# Patient Record
Sex: MALE | Race: WHITE
[De-identification: names, ages, dates, MRNs, and addresses within clinical notes are randomized per-mention and may not be internally consistent; named-entity substitution may affect disease eponyms.]

---

## 2019-05-24 ENCOUNTER — HOSPITAL ENCOUNTER (OUTPATIENT)
Dept: HOSPITAL 8 - CACL | Age: 49
Discharge: HOME | End: 2019-05-24
Attending: INTERNAL MEDICINE
Payer: COMMERCIAL

## 2019-05-24 VITALS — BODY MASS INDEX: 26.56 KG/M2 | HEIGHT: 73 IN | WEIGHT: 200.4 LBS

## 2019-05-24 VITALS — SYSTOLIC BLOOD PRESSURE: 147 MMHG | DIASTOLIC BLOOD PRESSURE: 56 MMHG

## 2019-05-24 DIAGNOSIS — Z98.890: ICD-10-CM

## 2019-05-24 DIAGNOSIS — Z72.89: ICD-10-CM

## 2019-05-24 DIAGNOSIS — Z82.49: ICD-10-CM

## 2019-05-24 DIAGNOSIS — E66.3: ICD-10-CM

## 2019-05-24 DIAGNOSIS — I08.0: Primary | ICD-10-CM

## 2019-05-24 PROCEDURE — 93312 ECHO TRANSESOPHAGEAL: CPT

## 2019-05-24 PROCEDURE — 93325 DOPPLER ECHO COLOR FLOW MAPG: CPT

## 2019-05-24 PROCEDURE — 93321 DOPPLER ECHO F-UP/LMTD STD: CPT

## 2019-06-25 ENCOUNTER — HOSPITAL ENCOUNTER (OUTPATIENT)
Dept: HOSPITAL 8 - CACL | Age: 49
Discharge: HOME | End: 2019-06-25
Attending: INTERNAL MEDICINE
Payer: COMMERCIAL

## 2019-06-25 VITALS — SYSTOLIC BLOOD PRESSURE: 143 MMHG | DIASTOLIC BLOOD PRESSURE: 61 MMHG

## 2019-06-25 VITALS — HEIGHT: 73 IN | WEIGHT: 210.43 LBS | BODY MASS INDEX: 27.89 KG/M2

## 2019-06-25 DIAGNOSIS — Z98.890: ICD-10-CM

## 2019-06-25 DIAGNOSIS — I35.1: Primary | ICD-10-CM

## 2019-06-25 DIAGNOSIS — E66.3: ICD-10-CM

## 2019-06-25 DIAGNOSIS — Z72.89: ICD-10-CM

## 2019-06-25 DIAGNOSIS — Z82.49: ICD-10-CM

## 2019-06-25 PROCEDURE — 93458 L HRT ARTERY/VENTRICLE ANGIO: CPT

## 2019-06-25 PROCEDURE — C1894 INTRO/SHEATH, NON-LASER: HCPCS

## 2019-06-25 PROCEDURE — C1769 GUIDE WIRE: HCPCS

## 2019-06-25 PROCEDURE — 99157 MOD SED OTHER PHYS/QHP EA: CPT

## 2019-06-25 PROCEDURE — 99156 MOD SED OTH PHYS/QHP 5/>YRS: CPT

## 2019-07-01 NOTE — PROGRESS NOTES
REFERRING PHYSICIAN: Dr. Carolyn Vital MD.     CONSULTING PHYSICIAN: Amairani Muller M.D. FACS.    CHIEF COMPLAINT: Fatigue.    HISTORY OF PRESENT ILLNESS: The patient is a 48 y.o. male with history significant for severe aortic insufficiency and bicuspid aortic valve.  No history of HTN, diabetes or hyperlipidemia.  Today he states.  He is able to walk 1/2 mile before having to stop and rest. Over the past 6 months he had noticed a distinct increase in fatigue.  He also has noted palpitations when lying down to rest.  He denies chest pain/pressure, dyspnea, orthopnea, dizziness, lower extremity edema, syncope, or near syncope.      PAST MEDICAL HISTORY:   Active Ambulatory Problems     Diagnosis Date Noted   • No Active Ambulatory Problems     Resolved Ambulatory Problems     Diagnosis Date Noted   • No Resolved Ambulatory Problems     No Additional Past Medical History       PAST SURGICAL HISTORY: History reviewed. No pertinent surgical history.     ALLERGIES: No Known Allergies     CURRENT MEDICATIONS: No current outpatient prescriptions on file.    FAMILY HISTORY: History reviewed. No pertinent family history.     SOCIAL HISTORY:   Social History     Social History   • Marital status:      Spouse name: N/A   • Number of children: N/A   • Years of education: N/A     Occupational History   • Not on file.     Social History Main Topics   • Smoking status: Never Smoker   • Smokeless tobacco: Current User     Types: Chew   • Alcohol use 2.4 oz/week     4 Cans of beer per week   • Drug use: No   • Sexual activity: Not on file     Other Topics Concern   • Not on file     Social History Narrative   • No narrative on file       REVIEW OF SYSTEMS:  Review of Systems   Constitutional: Negative for chills, fever and weight loss.   HENT: Negative for ear pain, hearing loss and tinnitus.    Eyes: Negative for blurred vision, double vision and photophobia.   Respiratory: Negative for cough, shortness of breath and  "wheezing.    Cardiovascular: Positive for palpitations. Negative for chest pain, orthopnea and leg swelling.   Gastrointestinal: Negative for abdominal pain, diarrhea, melena, nausea and vomiting.   Genitourinary: Negative for dysuria, flank pain, frequency, hematuria and urgency.   Skin: Negative for itching and rash.   Neurological: Negative for tingling, seizures, loss of consciousness, weakness and headaches.   Psychiatric/Behavioral: Negative for depression, memory loss and suicidal ideas. The patient does not have insomnia.          PHYSICAL EXAMINATION:    /68 (BP Location: Left arm, Patient Position: Sitting, BP Cuff Size: Adult)   Pulse 63   Temp 36.5 °C (97.7 °F) (Temporal)   Ht 1.854 m (6' 1\")   Wt 95.3 kg (210 lb 1.6 oz)   SpO2 97%   BMI 27.72 kg/m² .    Physical Exam   Constitutional: He is oriented to person, place, and time and well-developed, well-nourished, and in no distress. No distress.   HENT:   Head: Normocephalic and atraumatic.   Eyes: Pupils are equal, round, and reactive to light.   Neck: Neck supple. No JVD present.   Cardiovascular: Normal rate, regular rhythm and intact distal pulses.  Exam reveals no gallop and no friction rub.    Murmur heard.   Diastolic murmur is present with a grade of 2/6   Pulmonary/Chest: Effort normal and breath sounds normal. No respiratory distress. He has no wheezes. He has no rales.   Abdominal: Soft. He exhibits no distension. There is no tenderness. There is no rebound.   Musculoskeletal: He exhibits no edema, tenderness or deformity.   Neurological: He is alert and oriented to person, place, and time. Gait normal. GCS score is 15.   Skin: Skin is warm and dry. No rash noted. No erythema.   Psychiatric: Memory and affect normal.   Nursing note and vitals reviewed.      LABS REVIEWED:  3/18/2019: TSH 0.88, , K 4.2, , CO2 23, BUN 18, Scr 1.0, glucose 87, AST 26, ALT 37.    IMAGING REVIEWED AND INTERPRETED:    ECHOCARDIOGRAM:  ESVIN " 5/21/2019 ([facility])  Normal LV function, mild MR, bicuspid aortic valve stenosis with severe AI.  The valve does not coapt well.    ANGIOGRAM: 6/25/2019 [facility]  Normal.      IMPRESSION:  Severe aortic regurgitation, bicuspid aortic valve.      PLAN:  I recommend that he undergo aortic valve replacement and intraoperative transesophageal echocardiography.    The procedure, its risks, benefits, potential complications and alternative treatments were discussed with the patient in detail including the risks should he decide not to undergo my recommended treatment. All of his questions were answered to his satisfaction and he is willing to proceed with the operation. The risks include death, stroke,  infection: to include a rare bacterial infection related to the use of the heart/lung machine, deborah-operative myocardial infarction, dysrhythmias, diaphragmatic paralysis, chest wall paresthesia, tracheostomy, kidney or other organ failure, possible return to the operating room for bleeding, bleeding requiring transfusion with its attendant risks including AIDS or hepatitis, dehiscence of surgical incisions, respiratory complications including the need for prolonged ventilator support, Protamine or other drug reaction, peripheral neuropathy, loss of limb, and miscount of surgical items. The STS mortality risk score is 1% and the morbidity and mortality risk score is 4.2%. The scores were discussed with patient.    Findings and recommendations have been discussed with the patient’s cardiologist Dr. Shirley Vital.  Thank you for this very challenging consultation and participation in the patient’s care.  I will keep you apprised of all future developments.      The operation is scheduled for Tuesday, August 6, 2019 at 7:30 AM at [facility].       Sincerely,     Amairani Muller M.D. MARISOL.      Amairani GOODWIN M.D. FACS performed a substantial portion of the EM visit face-to-face with the same  patient on the same date of service with the APRN, AWAIS Camara. I was personally involved in reviewing and interpreting the films and conducted elements of the history and physical exam. I performed all of the medical decision making for the patient.

## 2019-07-02 ENCOUNTER — OFFICE VISIT (OUTPATIENT)
Dept: SURGERY | Facility: MEDICAL CENTER | Age: 49
End: 2019-07-02
Payer: COMMERCIAL

## 2019-07-02 VITALS
SYSTOLIC BLOOD PRESSURE: 154 MMHG | HEIGHT: 73 IN | HEART RATE: 63 BPM | DIASTOLIC BLOOD PRESSURE: 68 MMHG | WEIGHT: 210.1 LBS | TEMPERATURE: 97.7 F | OXYGEN SATURATION: 97 % | BODY MASS INDEX: 27.85 KG/M2

## 2019-07-02 DIAGNOSIS — Q23.1 AORTIC INSUFFICIENCY DUE TO BICUSPID AORTIC VALVE: ICD-10-CM

## 2019-07-02 PROCEDURE — 99205 OFFICE O/P NEW HI 60 MIN: CPT | Performed by: THORACIC SURGERY (CARDIOTHORACIC VASCULAR SURGERY)

## 2019-07-02 ASSESSMENT — ENCOUNTER SYMPTOMS
SHORTNESS OF BREATH: 0
WEIGHT LOSS: 0
FEVER: 0
DEPRESSION: 0
COUGH: 0
VOMITING: 0
DIARRHEA: 0
TINGLING: 0
LOSS OF CONSCIOUSNESS: 0
ORTHOPNEA: 0
INSOMNIA: 0
MEMORY LOSS: 0
FLANK PAIN: 0
PALPITATIONS: 1
BLURRED VISION: 0
SEIZURES: 0
DOUBLE VISION: 0
NAUSEA: 0
CHILLS: 0
PHOTOPHOBIA: 0
WEAKNESS: 0
HEADACHES: 0
WHEEZING: 0
ABDOMINAL PAIN: 0

## 2019-07-02 NOTE — PATIENT INSTRUCTIONS
You have been scheduled for open heart surgery. A surgery scheduler will be calling you with a date for your preoperative testing.    INSTRUCTIONS FOR SURGERY:    Your surgery is scheduled for ___Tuesday August 6, 2019_____ at _____0730 am_______     at [facility]. (Times for surgery may change occasionally if an emergency arises.)       On _Monday August 5, 2019____ at 11:30am preoperative testing and paperwork must be completed at [facility], [address] [place] [address].       Go in the  Entrance and check in at the First Floor Admitting desk. Tell them you are pre-registering for your heart surgery. You will be directed from there to the Star Unit on the 2nd floor. Your preoperative testing will include blood work, urinalysis and chest x-ray and may include other testing such as carotid duplex and vein mapping. Lab work may not be done more than 72 hours before surgery.  It is ok to eat and drink before lab work    You will also meet with a cardiac nurse for pre-operative teaching.        DO NOT EAT OR DRINK ANYTHING AFTER MIDNIGHT THE NIGHT BEFORE SURGERY    PLEASE STOP THE FOLLOWING MEDICATIONS ON THE TIMES STATED BELOW:     STOP 4 DAYS PRIOR TO SURGERY: ELIQUIS, XARELTO, PRADAXA, EFFIENT                       STOP 5 DAYS PRIOR TO SURGERY: COUMADIN, EXTRA STRENGTH ASPIRIN                 STOP 7 DAYS PRIOR TO SURGERY: PLAVIX, BRILANTA                 STOP IMMEDIATELY: FISH OIL, GLUCOSAMINE, VITAMIN E AND GINKO BILOBA         On the day of surgery at 4:30am, you will admit to [facility]. Go to the Emergency Room entrance and check in at the admitting desk in the Emergency Room. Tell them you are there for heart surgery.  PLEASE DO NOT BE LATE.      Should you have any additional questions please call the office

## 2019-08-05 ENCOUNTER — HOSPITAL ENCOUNTER (INPATIENT)
Dept: HOSPITAL 8 - ORIP | Age: 49
LOS: 5 days | Discharge: HOME | DRG: 220 | End: 2019-08-10
Attending: THORACIC SURGERY (CARDIOTHORACIC VASCULAR SURGERY) | Admitting: THORACIC SURGERY (CARDIOTHORACIC VASCULAR SURGERY)
Payer: COMMERCIAL

## 2019-08-05 VITALS — HEIGHT: 73 IN | WEIGHT: 213.41 LBS | BODY MASS INDEX: 28.28 KG/M2

## 2019-08-05 DIAGNOSIS — I35.1: Primary | ICD-10-CM

## 2019-08-05 DIAGNOSIS — F17.210: ICD-10-CM

## 2019-08-05 DIAGNOSIS — I44.2: ICD-10-CM

## 2019-08-05 DIAGNOSIS — I10: ICD-10-CM

## 2019-08-05 PROCEDURE — 82947 ASSAY GLUCOSE BLOOD QUANT: CPT

## 2019-08-05 PROCEDURE — 86850 RBC ANTIBODY SCREEN: CPT

## 2019-08-05 PROCEDURE — 84132 ASSAY OF SERUM POTASSIUM: CPT

## 2019-08-05 PROCEDURE — C1768 GRAFT, VASCULAR: HCPCS

## 2019-08-05 PROCEDURE — 85014 HEMATOCRIT: CPT

## 2019-08-05 PROCEDURE — 86923 COMPATIBILITY TEST ELECTRIC: CPT

## 2019-08-05 PROCEDURE — 85347 COAGULATION TIME ACTIVATED: CPT

## 2019-08-05 PROCEDURE — 94002 VENT MGMT INPAT INIT DAY: CPT

## 2019-08-05 PROCEDURE — 88305 TISSUE EXAM BY PATHOLOGIST: CPT

## 2019-08-05 PROCEDURE — 36600 WITHDRAWAL OF ARTERIAL BLOOD: CPT

## 2019-08-05 PROCEDURE — 82040 ASSAY OF SERUM ALBUMIN: CPT

## 2019-08-05 PROCEDURE — 85025 COMPLETE CBC W/AUTO DIFF WBC: CPT

## 2019-08-05 PROCEDURE — 93321 DOPPLER ECHO F-UP/LMTD STD: CPT

## 2019-08-05 PROCEDURE — 71046 X-RAY EXAM CHEST 2 VIEWS: CPT

## 2019-08-05 PROCEDURE — 93325 DOPPLER ECHO COLOR FLOW MAPG: CPT

## 2019-08-05 PROCEDURE — 84295 ASSAY OF SERUM SODIUM: CPT

## 2019-08-05 PROCEDURE — 71045 X-RAY EXAM CHEST 1 VIEW: CPT

## 2019-08-05 PROCEDURE — 85610 PROTHROMBIN TIME: CPT

## 2019-08-05 PROCEDURE — P9045 ALBUMIN (HUMAN), 5%, 250 ML: HCPCS

## 2019-08-05 PROCEDURE — 86900 BLOOD TYPING SEROLOGIC ABO: CPT

## 2019-08-05 PROCEDURE — 80053 COMPREHEN METABOLIC PANEL: CPT

## 2019-08-05 PROCEDURE — 82962 GLUCOSE BLOOD TEST: CPT

## 2019-08-05 PROCEDURE — 85730 THROMBOPLASTIN TIME PARTIAL: CPT

## 2019-08-05 PROCEDURE — 83735 ASSAY OF MAGNESIUM: CPT

## 2019-08-05 PROCEDURE — C1760 CLOSURE DEV, VASC: HCPCS

## 2019-08-05 PROCEDURE — 85018 HEMOGLOBIN: CPT

## 2019-08-05 PROCEDURE — 82810 BLOOD GASES O2 SAT ONLY: CPT

## 2019-08-05 PROCEDURE — 93312 ECHO TRANSESOPHAGEAL: CPT

## 2019-08-05 PROCEDURE — 82330 ASSAY OF CALCIUM: CPT

## 2019-08-05 PROCEDURE — 36415 COLL VENOUS BLD VENIPUNCTURE: CPT

## 2019-08-05 PROCEDURE — 83036 HEMOGLOBIN GLYCOSYLATED A1C: CPT

## 2019-08-05 PROCEDURE — 93005 ELECTROCARDIOGRAM TRACING: CPT

## 2019-08-05 PROCEDURE — 82800 BLOOD PH: CPT

## 2019-08-05 PROCEDURE — 87081 CULTURE SCREEN ONLY: CPT

## 2019-08-05 PROCEDURE — 93880 EXTRACRANIAL BILAT STUDY: CPT

## 2019-08-05 PROCEDURE — 82803 BLOOD GASES ANY COMBINATION: CPT

## 2019-08-05 PROCEDURE — P9047 ALBUMIN (HUMAN), 25%, 50ML: HCPCS

## 2019-08-05 PROCEDURE — 80048 BASIC METABOLIC PNL TOTAL CA: CPT

## 2019-08-05 PROCEDURE — C1751 CATH, INF, PER/CENT/MIDLINE: HCPCS

## 2019-08-05 PROCEDURE — 81003 URINALYSIS AUTO W/O SCOPE: CPT

## 2019-08-05 PROCEDURE — 85049 AUTOMATED PLATELET COUNT: CPT

## 2019-08-06 PROCEDURE — B548ZZA ULTRASONOGRAPHY OF SUPERIOR VENA CAVA, GUIDANCE: ICD-10-PCS | Performed by: THORACIC SURGERY (CARDIOTHORACIC VASCULAR SURGERY)

## 2019-08-06 PROCEDURE — B246ZZ4 ULTRASONOGRAPHY OF RIGHT AND LEFT HEART, TRANSESOPHAGEAL: ICD-10-PCS | Performed by: THORACIC SURGERY (CARDIOTHORACIC VASCULAR SURGERY)

## 2019-08-06 PROCEDURE — 02RF08Z REPLACEMENT OF AORTIC VALVE WITH ZOOPLASTIC TISSUE, OPEN APPROACH: ICD-10-PCS | Performed by: THORACIC SURGERY (CARDIOTHORACIC VASCULAR SURGERY)

## 2019-08-06 PROCEDURE — 5A1221Z PERFORMANCE OF CARDIAC OUTPUT, CONTINUOUS: ICD-10-PCS | Performed by: THORACIC SURGERY (CARDIOTHORACIC VASCULAR SURGERY)

## 2019-08-06 PROCEDURE — 03HY32Z INSERTION OF MONITORING DEVICE INTO UPPER ARTERY, PERCUTANEOUS APPROACH: ICD-10-PCS | Performed by: THORACIC SURGERY (CARDIOTHORACIC VASCULAR SURGERY)

## 2019-08-06 PROCEDURE — 02HV33Z INSERTION OF INFUSION DEVICE INTO SUPERIOR VENA CAVA, PERCUTANEOUS APPROACH: ICD-10-PCS | Performed by: THORACIC SURGERY (CARDIOTHORACIC VASCULAR SURGERY)

## 2019-08-10 VITALS — DIASTOLIC BLOOD PRESSURE: 80 MMHG | SYSTOLIC BLOOD PRESSURE: 123 MMHG

## 2019-09-05 NOTE — PROGRESS NOTES
"CHIEF COMPLAINT: Post-op visit     PROCEDURE: 8/6/2019 [facility]  Aortic valve replacement (29 mm  [place] PERIMOUNT Magna Ease pericardial valve) and intraoperative ESVIN.    HPI: Mr. Castro returns today for post operative visit.  HE is accompanied by his wife.  He states he is feeling better each day.  His pain has been controlled and he is walking 3 miles/day with no problems.  He denies any shortness of breath, dizziness, syncope or near syncope. BP has been well controlled.  He has been following with Cardiology.    /64 (BP Location: Left arm, Patient Position: Sitting, BP Cuff Size: Adult)   Pulse 72   Temp 36.6 °C (97.9 °F) (Temporal)   Ht 1.854 m (6' 1\")   Wt 92.5 kg (203 lb 14.8 oz)   SpO2 99%     PHYSICAL EXAM:  CARDIAC: S1, S2, no murmurs, gallops, rub  PULMONARY: CTA bilaterally  SKIN: no edema  INCISIONS: Sternum stable, wounds well healed    PLAN: Continue ASA.  Discharged from clinic.  He will continue to follow with Dr. Carolyn Cardoso in Cardiology.  We reviewed post operative lifting/sternal precautions.    Overall, we are very pleased with the patient’s progress and we have instructed the patient to follow-up with us in the future should they have any concerns related to there surgery, otherwise, we will see the patient on a prn basis. The patient will continue to follow-up with you and there primary care physician. The patient has been informed that any further prescription refills should be done through there primary care physician and/or cardiologist.  They acknowledged understanding.  Thank you for allowing us to participate in the care of this very pleasant patient and please let us know if there is any way we may be of further assistance.  "

## 2019-09-16 ENCOUNTER — OFFICE VISIT (OUTPATIENT)
Dept: CARDIOTHORACIC SURGERY | Facility: MEDICAL CENTER | Age: 49
End: 2019-09-16
Payer: COMMERCIAL

## 2019-09-16 VITALS
WEIGHT: 203.93 LBS | DIASTOLIC BLOOD PRESSURE: 64 MMHG | OXYGEN SATURATION: 99 % | SYSTOLIC BLOOD PRESSURE: 112 MMHG | HEART RATE: 72 BPM | BODY MASS INDEX: 27.03 KG/M2 | HEIGHT: 73 IN | TEMPERATURE: 97.9 F

## 2019-09-16 DIAGNOSIS — I35.0 SEVERE AORTIC STENOSIS: ICD-10-CM

## 2019-09-16 DIAGNOSIS — Z98.890 POST-OPERATIVE STATE: ICD-10-CM

## 2019-09-16 RX ORDER — LOSARTAN POTASSIUM 50 MG/1
50 TABLET ORAL DAILY
Refills: 4 | COMMUNITY
Start: 2019-09-10

## 2020-07-06 ENCOUNTER — HOSPITAL ENCOUNTER (OUTPATIENT)
Dept: HOSPITAL 8 - CVU | Age: 50
Discharge: HOME | End: 2020-07-06
Attending: INTERNAL MEDICINE
Payer: COMMERCIAL

## 2020-07-06 DIAGNOSIS — I08.8: Primary | ICD-10-CM

## 2020-07-06 DIAGNOSIS — I11.9: ICD-10-CM

## 2020-07-06 PROCEDURE — 93306 TTE W/DOPPLER COMPLETE: CPT

## 2021-03-11 ENCOUNTER — PRE-ADMISSION TESTING (OUTPATIENT)
Dept: ADMISSIONS | Facility: MEDICAL CENTER | Age: 51
End: 2021-03-11
Attending: ORTHOPAEDIC SURGERY
Payer: COMMERCIAL

## 2021-03-11 DIAGNOSIS — Z01.812 PRE-OPERATIVE LABORATORY EXAMINATION: ICD-10-CM

## 2021-03-11 DIAGNOSIS — Z01.810 PRE-OPERATIVE CARDIOVASCULAR EXAMINATION: ICD-10-CM

## 2021-03-11 LAB
ALBUMIN SERPL BCP-MCNC: 4.5 G/DL (ref 3.2–4.9)
ALBUMIN/GLOB SERPL: 1.9 G/DL
ALP SERPL-CCNC: 65 U/L (ref 30–99)
ALT SERPL-CCNC: 41 U/L (ref 2–50)
ANION GAP SERPL CALC-SCNC: 10 MMOL/L (ref 7–16)
AST SERPL-CCNC: 22 U/L (ref 12–45)
BILIRUB SERPL-MCNC: 0.6 MG/DL (ref 0.1–1.5)
BUN SERPL-MCNC: 17 MG/DL (ref 8–22)
CALCIUM SERPL-MCNC: 9.2 MG/DL (ref 8.4–10.2)
CHLORIDE SERPL-SCNC: 106 MMOL/L (ref 96–112)
CO2 SERPL-SCNC: 24 MMOL/L (ref 20–33)
CREAT SERPL-MCNC: 1.07 MG/DL (ref 0.5–1.4)
EKG IMPRESSION: NORMAL
ERYTHROCYTE [DISTWIDTH] IN BLOOD BY AUTOMATED COUNT: 38 FL (ref 35.9–50)
GLOBULIN SER CALC-MCNC: 2.4 G/DL (ref 1.9–3.5)
GLUCOSE SERPL-MCNC: 93 MG/DL (ref 65–99)
HCT VFR BLD AUTO: 46.5 % (ref 42–52)
HGB BLD-MCNC: 16.1 G/DL (ref 14–18)
MCH RBC QN AUTO: 30.6 PG (ref 27–33)
MCHC RBC AUTO-ENTMCNC: 34.6 G/DL (ref 33.7–35.3)
MCV RBC AUTO: 88.4 FL (ref 81.4–97.8)
PLATELET # BLD AUTO: 170 K/UL (ref 164–446)
PMV BLD AUTO: 8.2 FL (ref 9–12.9)
POTASSIUM SERPL-SCNC: 4.4 MMOL/L (ref 3.6–5.5)
PROT SERPL-MCNC: 6.9 G/DL (ref 6–8.2)
RBC # BLD AUTO: 5.26 M/UL (ref 4.7–6.1)
SODIUM SERPL-SCNC: 140 MMOL/L (ref 135–145)
WBC # BLD AUTO: 7 K/UL (ref 4.8–10.8)

## 2021-03-11 PROCEDURE — 93005 ELECTROCARDIOGRAM TRACING: CPT

## 2021-03-11 PROCEDURE — 85027 COMPLETE CBC AUTOMATED: CPT

## 2021-03-11 PROCEDURE — 36415 COLL VENOUS BLD VENIPUNCTURE: CPT

## 2021-03-11 PROCEDURE — 93010 ELECTROCARDIOGRAM REPORT: CPT | Performed by: INTERNAL MEDICINE

## 2021-03-11 PROCEDURE — 80053 COMPREHEN METABOLIC PANEL: CPT

## 2021-03-11 RX ORDER — ROSUVASTATIN CALCIUM 10 MG/1
10 TABLET, COATED ORAL
COMMUNITY
Start: 2021-01-11

## 2021-03-11 RX ORDER — AMOXICILLIN 500 MG/1
4 CAPSULE ORAL
COMMUNITY
Start: 2021-01-25

## 2021-03-11 NOTE — OR NURSING
Noted Abn unconfirmed EKG. Pt denies any CP, dizziness, or CP. Will fax copy to cardiology. Pt instructed to go to ER if any symptoms and to follow up with his cardiologist. Pt voices understanding and states he has a telephone appt with cardiologist on Friday 3/19/21.     Dr Cleary notified of procedure and abn EKG. Please note pt had AVR 2019.

## 2021-03-11 NOTE — PREPROCEDURE INSTRUCTIONS
"Pre-admit appointment completed. \"Preparing for your procedure\" sheet given to pt along with verbal and written instructions. Pt instructed to continue regularly prescribed medications through the day before surgery.     Wife states she asked cardiologist about holding aspirin and states they were unaware he was on it and to follow Dr Calderón's instructions regarding holding aspirin for surgery.     Pt denies any problems with anesthesia.    Pt to get COVID test 3/19/21.           Pt given instructions to self isolate after COVID test and to contact doctor if any symptoms of COVID occur.  "

## 2021-03-11 NOTE — PREPROCEDURE INSTRUCTIONS
Pt given SONIA education due to score of 5 on SONIA screening tool and enc to follow up with PCP. Wife states they are already discussing with PCP.

## 2021-03-12 NOTE — OR NURSING
Dr Cleary reviewed patients medical history and EKG. Based upon information available, Dr Cleary indicates:     Ok to proceed.  Thank you.     Juaquin Cleary M.D.  [facility]

## 2021-03-19 ENCOUNTER — PRE-ADMISSION TESTING (OUTPATIENT)
Dept: ADMISSIONS | Facility: MEDICAL CENTER | Age: 51
End: 2021-03-19
Attending: ORTHOPAEDIC SURGERY
Payer: COMMERCIAL

## 2021-03-19 DIAGNOSIS — Z01.812 PRE-OPERATIVE LABORATORY EXAMINATION: ICD-10-CM

## 2021-03-19 LAB
COVID ORDER STATUS COVID19: NORMAL
SARS-COV-2 RNA RESP QL NAA+PROBE: NOTDETECTED
SPECIMEN SOURCE: NORMAL

## 2021-03-19 PROCEDURE — C9803 HOPD COVID-19 SPEC COLLECT: HCPCS

## 2021-03-19 PROCEDURE — U0005 INFEC AGEN DETEC AMPLI PROBE: HCPCS

## 2021-03-19 PROCEDURE — U0003 INFECTIOUS AGENT DETECTION BY NUCLEIC ACID (DNA OR RNA); SEVERE ACUTE RESPIRATORY SYNDROME CORONAVIRUS 2 (SARS-COV-2) (CORONAVIRUS DISEASE [COVID-19]), AMPLIFIED PROBE TECHNIQUE, MAKING USE OF HIGH THROUGHPUT TECHNOLOGIES AS DESCRIBED BY CMS-2020-01-R: HCPCS

## 2021-03-21 ENCOUNTER — ANESTHESIA EVENT (OUTPATIENT)
Dept: SURGERY | Facility: MEDICAL CENTER | Age: 51
End: 2021-03-21
Payer: COMMERCIAL

## 2021-03-22 ENCOUNTER — ANESTHESIA (OUTPATIENT)
Dept: SURGERY | Facility: MEDICAL CENTER | Age: 51
End: 2021-03-22
Payer: COMMERCIAL

## 2021-03-22 ENCOUNTER — HOSPITAL ENCOUNTER (OUTPATIENT)
Facility: MEDICAL CENTER | Age: 51
End: 2021-03-22
Attending: ORTHOPAEDIC SURGERY | Admitting: ORTHOPAEDIC SURGERY
Payer: COMMERCIAL

## 2021-03-22 VITALS
OXYGEN SATURATION: 96 % | HEART RATE: 56 BPM | DIASTOLIC BLOOD PRESSURE: 82 MMHG | RESPIRATION RATE: 16 BRPM | HEIGHT: 73 IN | BODY MASS INDEX: 28.21 KG/M2 | WEIGHT: 212.85 LBS | TEMPERATURE: 97.2 F | SYSTOLIC BLOOD PRESSURE: 120 MMHG

## 2021-03-22 PROCEDURE — 160048 HCHG OR STATISTICAL LEVEL 1-5: Performed by: ORTHOPAEDIC SURGERY

## 2021-03-22 PROCEDURE — 502581 HCHG PACK, SHOULDER ARTHROSCOPY: Performed by: ORTHOPAEDIC SURGERY

## 2021-03-22 PROCEDURE — 160046 HCHG PACU - 1ST 60 MINS PHASE II: Performed by: ORTHOPAEDIC SURGERY

## 2021-03-22 PROCEDURE — 160025 RECOVERY II MINUTES (STATS): Performed by: ORTHOPAEDIC SURGERY

## 2021-03-22 PROCEDURE — 160009 HCHG ANES TIME/MIN: Performed by: ORTHOPAEDIC SURGERY

## 2021-03-22 PROCEDURE — 160035 HCHG PACU - 1ST 60 MINS PHASE I: Performed by: ORTHOPAEDIC SURGERY

## 2021-03-22 PROCEDURE — 64415 NJX AA&/STRD BRCH PLXS IMG: CPT | Performed by: ORTHOPAEDIC SURGERY

## 2021-03-22 PROCEDURE — 501838 HCHG SUTURE GENERAL: Performed by: ORTHOPAEDIC SURGERY

## 2021-03-22 PROCEDURE — 160041 HCHG SURGERY MINUTES - EA ADDL 1 MIN LEVEL 4: Performed by: ORTHOPAEDIC SURGERY

## 2021-03-22 PROCEDURE — 700105 HCHG RX REV CODE 258: Performed by: ORTHOPAEDIC SURGERY

## 2021-03-22 PROCEDURE — 700111 HCHG RX REV CODE 636 W/ 250 OVERRIDE (IP): Performed by: ORTHOPAEDIC SURGERY

## 2021-03-22 PROCEDURE — 501162 HCHG PROBE, VULCAN: Performed by: ORTHOPAEDIC SURGERY

## 2021-03-22 PROCEDURE — 700111 HCHG RX REV CODE 636 W/ 250 OVERRIDE (IP): Performed by: ANESTHESIOLOGY

## 2021-03-22 PROCEDURE — 160036 HCHG PACU - EA ADDL 30 MINS PHASE I: Performed by: ORTHOPAEDIC SURGERY

## 2021-03-22 PROCEDURE — 160002 HCHG RECOVERY MINUTES (STAT): Performed by: ORTHOPAEDIC SURGERY

## 2021-03-22 PROCEDURE — 160029 HCHG SURGERY MINUTES - 1ST 30 MINS LEVEL 4: Performed by: ORTHOPAEDIC SURGERY

## 2021-03-22 PROCEDURE — 700101 HCHG RX REV CODE 250: Performed by: ANESTHESIOLOGY

## 2021-03-22 RX ORDER — HYDROMORPHONE HYDROCHLORIDE 1 MG/ML
0.4 INJECTION, SOLUTION INTRAMUSCULAR; INTRAVENOUS; SUBCUTANEOUS
Status: DISCONTINUED | OUTPATIENT
Start: 2021-03-22 | End: 2021-03-22 | Stop reason: HOSPADM

## 2021-03-22 RX ORDER — SODIUM CHLORIDE, SODIUM LACTATE, POTASSIUM CHLORIDE, CALCIUM CHLORIDE 600; 310; 30; 20 MG/100ML; MG/100ML; MG/100ML; MG/100ML
INJECTION, SOLUTION INTRAVENOUS CONTINUOUS
Status: DISCONTINUED | OUTPATIENT
Start: 2021-03-22 | End: 2021-03-22 | Stop reason: HOSPADM

## 2021-03-22 RX ORDER — HYDROMORPHONE HYDROCHLORIDE 1 MG/ML
0.2 INJECTION, SOLUTION INTRAMUSCULAR; INTRAVENOUS; SUBCUTANEOUS
Status: DISCONTINUED | OUTPATIENT
Start: 2021-03-22 | End: 2021-03-22 | Stop reason: HOSPADM

## 2021-03-22 RX ORDER — LIDOCAINE HYDROCHLORIDE 40 MG/ML
SOLUTION TOPICAL PRN
Status: DISCONTINUED | OUTPATIENT
Start: 2021-03-22 | End: 2021-03-22 | Stop reason: SURG

## 2021-03-22 RX ORDER — ROCURONIUM BROMIDE 10 MG/ML
INJECTION, SOLUTION INTRAVENOUS PRN
Status: DISCONTINUED | OUTPATIENT
Start: 2021-03-22 | End: 2021-03-22 | Stop reason: SURG

## 2021-03-22 RX ORDER — MEPERIDINE HYDROCHLORIDE 25 MG/ML
6.25 INJECTION INTRAMUSCULAR; INTRAVENOUS; SUBCUTANEOUS
Status: DISCONTINUED | OUTPATIENT
Start: 2021-03-22 | End: 2021-03-22 | Stop reason: HOSPADM

## 2021-03-22 RX ORDER — EPINEPHRINE 1 MG/ML(1)
AMPUL (ML) INJECTION
Status: DISCONTINUED | OUTPATIENT
Start: 2021-03-22 | End: 2021-03-22 | Stop reason: HOSPADM

## 2021-03-22 RX ORDER — DEXAMETHASONE SODIUM PHOSPHATE 4 MG/ML
INJECTION, SOLUTION INTRA-ARTICULAR; INTRALESIONAL; INTRAMUSCULAR; INTRAVENOUS; SOFT TISSUE PRN
Status: DISCONTINUED | OUTPATIENT
Start: 2021-03-22 | End: 2021-03-22 | Stop reason: SURG

## 2021-03-22 RX ORDER — DIPHENHYDRAMINE HYDROCHLORIDE 50 MG/ML
12.5 INJECTION INTRAMUSCULAR; INTRAVENOUS
Status: DISCONTINUED | OUTPATIENT
Start: 2021-03-22 | End: 2021-03-22 | Stop reason: HOSPADM

## 2021-03-22 RX ORDER — ONDANSETRON 2 MG/ML
4 INJECTION INTRAMUSCULAR; INTRAVENOUS
Status: DISCONTINUED | OUTPATIENT
Start: 2021-03-22 | End: 2021-03-22 | Stop reason: HOSPADM

## 2021-03-22 RX ORDER — OXYCODONE HYDROCHLORIDE AND ACETAMINOPHEN 5; 325 MG/1; MG/1
2 TABLET ORAL
Status: DISCONTINUED | OUTPATIENT
Start: 2021-03-22 | End: 2021-03-22 | Stop reason: HOSPADM

## 2021-03-22 RX ORDER — LABETALOL HYDROCHLORIDE 5 MG/ML
5 INJECTION, SOLUTION INTRAVENOUS
Status: DISCONTINUED | OUTPATIENT
Start: 2021-03-22 | End: 2021-03-22 | Stop reason: HOSPADM

## 2021-03-22 RX ORDER — HYDROMORPHONE HYDROCHLORIDE 1 MG/ML
0.1 INJECTION, SOLUTION INTRAMUSCULAR; INTRAVENOUS; SUBCUTANEOUS
Status: DISCONTINUED | OUTPATIENT
Start: 2021-03-22 | End: 2021-03-22 | Stop reason: HOSPADM

## 2021-03-22 RX ORDER — LIDOCAINE HYDROCHLORIDE 20 MG/ML
INJECTION, SOLUTION EPIDURAL; INFILTRATION; INTRACAUDAL; PERINEURAL PRN
Status: DISCONTINUED | OUTPATIENT
Start: 2021-03-22 | End: 2021-03-22 | Stop reason: SURG

## 2021-03-22 RX ORDER — BUPIVACAINE HYDROCHLORIDE 2.5 MG/ML
INJECTION, SOLUTION EPIDURAL; INFILTRATION; INTRACAUDAL PRN
Status: DISCONTINUED | OUTPATIENT
Start: 2021-03-22 | End: 2021-03-22 | Stop reason: SURG

## 2021-03-22 RX ORDER — HALOPERIDOL 5 MG/ML
1 INJECTION INTRAMUSCULAR
Status: DISCONTINUED | OUTPATIENT
Start: 2021-03-22 | End: 2021-03-22 | Stop reason: HOSPADM

## 2021-03-22 RX ORDER — CEFAZOLIN SODIUM 1 G/3ML
INJECTION, POWDER, FOR SOLUTION INTRAMUSCULAR; INTRAVENOUS PRN
Status: DISCONTINUED | OUTPATIENT
Start: 2021-03-22 | End: 2021-03-22 | Stop reason: SURG

## 2021-03-22 RX ORDER — ONDANSETRON 2 MG/ML
INJECTION INTRAMUSCULAR; INTRAVENOUS PRN
Status: DISCONTINUED | OUTPATIENT
Start: 2021-03-22 | End: 2021-03-22 | Stop reason: SURG

## 2021-03-22 RX ORDER — KETOROLAC TROMETHAMINE 30 MG/ML
INJECTION, SOLUTION INTRAMUSCULAR; INTRAVENOUS PRN
Status: DISCONTINUED | OUTPATIENT
Start: 2021-03-22 | End: 2021-03-22 | Stop reason: SURG

## 2021-03-22 RX ORDER — OXYCODONE HYDROCHLORIDE AND ACETAMINOPHEN 5; 325 MG/1; MG/1
1 TABLET ORAL
Status: DISCONTINUED | OUTPATIENT
Start: 2021-03-22 | End: 2021-03-22 | Stop reason: HOSPADM

## 2021-03-22 RX ORDER — MIDAZOLAM HYDROCHLORIDE 1 MG/ML
INJECTION INTRAMUSCULAR; INTRAVENOUS PRN
Status: DISCONTINUED | OUTPATIENT
Start: 2021-03-22 | End: 2021-03-22 | Stop reason: SURG

## 2021-03-22 RX ADMIN — DEXAMETHASONE SODIUM PHOSPHATE 8 MG: 4 INJECTION, SOLUTION INTRAMUSCULAR; INTRAVENOUS at 09:37

## 2021-03-22 RX ADMIN — MIDAZOLAM HYDROCHLORIDE 2 MG: 1 INJECTION, SOLUTION INTRAMUSCULAR; INTRAVENOUS at 09:14

## 2021-03-22 RX ADMIN — SUGAMMADEX 200 MG: 100 INJECTION, SOLUTION INTRAVENOUS at 10:13

## 2021-03-22 RX ADMIN — LIDOCAINE HYDROCHLORIDE 4 ML: 40 SOLUTION TOPICAL at 09:29

## 2021-03-22 RX ADMIN — FENTANYL CITRATE 50 MCG: 50 INJECTION, SOLUTION INTRAMUSCULAR; INTRAVENOUS at 09:14

## 2021-03-22 RX ADMIN — CEFAZOLIN 2 G: 1 INJECTION, POWDER, FOR SOLUTION INTRAVENOUS at 09:30

## 2021-03-22 RX ADMIN — FENTANYL CITRATE 50 MCG: 50 INJECTION, SOLUTION INTRAMUSCULAR; INTRAVENOUS at 09:26

## 2021-03-22 RX ADMIN — SODIUM CHLORIDE, POTASSIUM CHLORIDE, SODIUM LACTATE AND CALCIUM CHLORIDE: 600; 310; 30; 20 INJECTION, SOLUTION INTRAVENOUS at 08:52

## 2021-03-22 RX ADMIN — PROPOFOL 150 MG: 10 INJECTION, EMULSION INTRAVENOUS at 09:28

## 2021-03-22 RX ADMIN — ONDANSETRON 4 MG: 2 INJECTION INTRAMUSCULAR; INTRAVENOUS at 10:13

## 2021-03-22 RX ADMIN — BUPIVACAINE HYDROCHLORIDE 25 ML: 2.5 INJECTION, SOLUTION EPIDURAL; INFILTRATION; INTRACAUDAL; PERINEURAL at 09:15

## 2021-03-22 RX ADMIN — KETOROLAC TROMETHAMINE 30 MG: 30 INJECTION, SOLUTION INTRAMUSCULAR at 10:13

## 2021-03-22 RX ADMIN — ROCURONIUM BROMIDE 50 MG: 10 INJECTION, SOLUTION INTRAVENOUS at 09:28

## 2021-03-22 RX ADMIN — LIDOCAINE HYDROCHLORIDE 1 ML: 20 INJECTION, SOLUTION EPIDURAL; INFILTRATION; INTRACAUDAL; PERINEURAL at 09:15

## 2021-03-22 ASSESSMENT — PAIN SCALES - GENERAL: PAIN_LEVEL: 0

## 2021-03-22 NOTE — ANESTHESIA PROCEDURE NOTES
Peripheral Block    Date/Time: 3/22/2021 9:15 AM  Performed by: Ishmael Domingo M.D.  Authorized by: Ishmael Domingo M.D.     Patient Location:  Pre-op  Start Time:  3/22/2021 9:15 AM  End Time:  3/22/2021 9:19 AM  Reason for Block: at surgeon's request and post-op pain management ONLY    patient identified, IV checked, site marked, risks and benefits discussed, surgical consent, monitors and equipment checked, pre-op evaluation and timeout performed    Patient Position:  Supine  Prep: ChloraPrep    Monitoring:  Heart rate, continuous pulse ox and cardiac monitor  Block Region:  Upper Extremity  Upper Extremity - Block Type:  BRACHIAL PLEXUS block, Interscalene approach    Laterality:  Left  Procedures: ultrasound guided  Image captured, interpreted and electronically stored.  Local Infiltration:  Lidocaine  Strength:  2 %  Dose:  1 ml  Block Type:  Single-shot  Needle Length:  100mm  Needle Gauge:  21 G  Needle Localization:  Ultrasound guidance  Injection Assessment:  Negative aspiration for heme, no paresthesia on injection, incremental injection and local visualized surrounding nerve on ultrasound  Evidence of intravascular injection: No     US Guided Interscalene Brachial Plexus Block   US transducer placed on the neck in oblique plane approximately at the level of C6.  Anterior and Middle Scalene (MSM) muscles identified with nerve trunks identified between the muscles.  Needle inserted lateral to probe and advanced under direct visualization through the MSM into a perineural position.  After negative aspiration, LA injected with ease and visualized surrounding the nerve trunks. U/S picture in paper chart.

## 2021-03-22 NOTE — OP REPORT
DATE OF SERVICE:  03/22/2021     PREOPERATIVE DIAGNOSIS:  Left shoulder partial-thickness rotator cuff tear.     POSTOPERATIVE DIAGNOSIS:  Left partial-thickness rotator cuff tear.     PROCEDURES:  1.  Left shoulder arthroscopy with extensive debridement of anterosuperior   labrum, supraspinatus, and bursa.  2.  Left shoulder arthroscopic subacromial decompression.     SURGEON:  Panda Calderón MD     ANESTHESIA:  General.     COMPLICATIONS:  None.     ESTIMATED BLOOD LOSS:  Minimal.     INDICATIONS:  The patient is a 50-year-old gentleman with a history of left   shoulder pain refractory to conservative management.  MRI shows evidence of a   partial-thickness rotator cuff tear.  He is indicated for arthroscopic   management.     FINDINGS:  Exam under anesthesia revealed a full range of motion with no   evidence of instability.  Arthroscopic examination of the glenohumeral joint   revealed the articular surfaces to be intact.  There was fraying of the   anterosuperior labrum with no detachment of the labrum.  The biceps anchor was   stable.  The biceps was intact and stable within the groove.  There was a   small partial-thickness tear with fraying of the superior edge of the   subscapularis and no significant detachment of the footprint.  There is an   articular side partial-thickness tear of the anterior supraspinatus in the mid   substance just off the footprint.  With debridement, it appeared to be about   50% thickness without significant involvement of the footprint and measuring   about 5 mm in diameter.  There were some loose flaps.  Examination of the   subacromial space revealed the bursal surface of the rotator cuff to be   intact.  There was a focal prominent anterior acromial hook.     DESCRIPTION OF PROCEDURE:  Following induction of general anesthesia, time-out   was performed confirming patient, site, and procedure.  Two grams of Ancef IV   were ordered and administered.  The patient was positioned  in the lateral   decubitus position with the left shoulder up.  The left shoulder and upper   extremity were prepped and draped in the usual fashion and suspended from 9   pounds longitudinal traction.  Standard posterior, anterior and lateral   portals were established and diagnostic arthroscopy was performed with   findings as above.  The shaver was used to debride the fraying of the   subscapularis tendon.  The partial-thickness supraspinatus tear was debrided   removing the loose flaps.  The frayed anterosuperior labrum was debrided.    Next, the subacromial space was entered.  The subacromial bursa was debrided   with the shaver.  The coracoacromial ligament was released from the anterior   acromion.  Anterior acromial hook was resected, leaving a smooth flat   undersurface of the acromion.  Remaining debris and bursa was debrided with   the shaver.  The arthroscope was removed.  Portals were closed with nylon   sutures.  Sterile dressing was applied followed by an UltraSling.  The patient   was awakened and extubated in the operating room and transferred to recovery   room in stable condition.        ______________________________  MD CHANO Vickers/LONNY/KALPESH    DD:  03/22/2021 10:23  DT:  03/22/2021 10:36    Job#:  [id number]

## 2021-03-22 NOTE — OR NURSING
1024 PT RECEIVED IN PACU, REPORT RECEIVED.  PT NON RESPONSIVE, OPA IN PLACE, NO ACUTE RESPIRATORY DISTRESS OBSERVED. VSS.  1048 PT AWAKE, FOLLOW COMMANDS. C/D OPA.  1125 VSS. PT DENIES PAIN, NAUSEA. TOLERATING PO FLUIDS. 1141 VSS. PT MEETS CRITERIA FOR TRANSFER TO STAGE 2.

## 2021-03-22 NOTE — ANESTHESIA POSTPROCEDURE EVALUATION
Patient: Tanner Castro    Procedure Summary     Date: 03/22/21 Room / Location:  OR  / SURGERY [facility]    Anesthesia Start: 0922 Anesthesia Stop: 1027    Procedures:       ARTHROSCOPY, SHOULDER, WITH DEBRIDEMENT (Left Shoulder)      DECOMPRESSION, SHOULDER, ARTHROSCOPIC - SUBACROMIAL (Left Shoulder) Diagnosis: (SUPRASPINATUS SYNDROME LEFT)    Surgeons: Panda Calderón M.D. Responsible Provider: Ishmael Domingo M.D.    Anesthesia Type: general, peripheral nerve block ASA Status: 2          Final Anesthesia Type: general, peripheral nerve block  Last vitals  BP   Blood Pressure: 125/81    Temp   36.2 °C (97.2 °F)    Pulse   (!) 55   Resp   16    SpO2   (!) 76 %      Anesthesia Post Evaluation    Patient location during evaluation: PACU  Patient participation: complete - patient participated  Level of consciousness: awake and alert  Pain score: 0    Airway patency: patent  Anesthetic complications: no  Cardiovascular status: hemodynamically stable  Respiratory status: acceptable  Hydration status: euvolemic    PONV: none          No complications documented.     Nurse Pain Score: 0 (NPRS)

## 2021-03-22 NOTE — ANESTHESIA PROCEDURE NOTES
Airway    Date/Time: 3/22/2021 9:29 AM  Performed by: Ishmael Domingo M.D.  Authorized by: Ishmael Domingo M.D.     Location:  OR  Urgency:  Elective  Difficult Airway: No    Indications for Airway Management:  Anesthesia      Spontaneous Ventilation: absent    Sedation Level:  Deep  Preoxygenated: Yes    Patient Position:  Sniffing  Mask Difficulty Assessment:  2 - vent by mask + OA or adjuvant +/- NMBA  Final Airway Type:  Endotracheal airway  Final Endotracheal Airway:  ETT  Cuffed: Yes    Technique Used for Successful ETT Placement:  Direct laryngoscopy    Insertion Site:  Oral  Blade Type:  Ismael  Laryngoscope Blade/Videolaryngoscope Blade Size:  4  ETT Size (mm):  7.5  Measured from:  Teeth  ETT to Teeth (cm):  24  Placement Verified by: auscultation and capnometry    Cormack-Lehane Classification:  Grade IIa - partial view of glottis  Number of Attempts at Approach:  1   Atraumatic DLx1

## 2021-03-22 NOTE — ANESTHESIA TIME REPORT
Anesthesia Start and Stop Event Times     Date Time Event    3/22/2021 0909 Ready for Procedure     0922 Anesthesia Start     1027 Anesthesia Stop        Responsible Staff  03/22/21    Name Role Begin End    Ishmael Domingo M.D. Anesth 0922 1027        Preop Diagnosis (Free Text):  Pre-op Diagnosis     SUPRASPINATUS SYNDROME LEFT        Preop Diagnosis (Codes):    Post op Diagnosis  Left shoulder pain      Premium Reason  Non-Premium    Comments: nerve block

## 2021-03-22 NOTE — DISCHARGE INSTRUCTIONS
ACTIVITY: Rest and take it easy for the first 24 hours.  A responsible adult is recommended to remain with you during that time.  It is normal to feel sleepy.  We encourage you to not do anything that requires balance, judgment or coordination.    MILD FLU-LIKE SYMPTOMS ARE NORMAL. YOU MAY EXPERIENCE GENERALIZED MUSCLE ACHES, THROAT IRRITATION, HEADACHE AND/OR SOME NAUSEA.    FOR 24 HOURS DO NOT:  Drive, operate machinery or run household appliances.  Drink beer or alcoholic beverages.   Make important decisions or sign legal documents.    SPECIAL INSTRUCTIONS: ACTIVITY AS TOLERATES WITH IMMOBILIZER ON.  NON WEIGHT  BEARING TO LEFT UPPER EXTREMITY CONTINUOUSLY. SLEEP/REST WITH HEAD OF BED ELEVATED.  APPLY ICE PACK TO LEFT SHOULDER (20 MINUTES ON 20 MINUTES OFF) WHILE AWAKE.    DIET: To avoid nausea, slowly advance diet as tolerated, avoiding spicy or greasy foods for the first day.  Add more substantial food to your diet according to your physician's instructions.  Babies can be fed formula or breast milk as soon as they are hungry.  INCREASE FLUIDS AND FIBER TO AVOID CONSTIPATION.    SURGICAL DRESSING/BATHING: KEEP DRESSING CLEAN AND DRY.  YOU MAY SHOWER WITH INCISION COVERED.      FOLLOW-UP APPOINTMENT:  A follow-up appointment should be arranged with your doctor; call to schedule.    You should CALL YOUR PHYSICIAN if you develop:  Fever greater than 101 degrees F.  Pain not relieved by medication, or persistent nausea or vomiting.  Excessive bleeding (blood soaking through dressing) or unexpected drainage from the wound.  Extreme redness or swelling around the incision site, drainage of pus or foul smelling drainage.  Inability to urinate or empty your bladder within 8 hours.  Problems with breathing or chest pain.    You should call 911 if you develop problems with breathing or chest pain.  If you are unable to contact your doctor or surgical center, you should go to the nearest emergency room or urgent care  center.      Physician's telephone #: DR. HOLBROOK  [phone]    If any questions arise, call your doctor.  If your doctor is not available, please feel free to call the Surgical Center at [phone]. The Contact Center is open Monday through Friday 7AM to 5PM and may speak to a nurse at [phone], or toll free at [phone].     A registered nurse may call you a few days after your surgery to see how you are doing after your procedure.    MEDICATIONS: Resume taking daily medication.  Take prescribed pain medication with food.  If no medication is prescribed, you may take non-aspirin pain medication if needed.  PAIN MEDICATION CAN BE VERY CONSTIPATING.  Take a stool softener or laxative such as senokot, pericolace, or milk of magnesia if needed.    Prescription given PRE OPERATIVELY.      NO ORAL PAIN MEDICATION GIVEN IN RECOVERY.     If your physician has prescribed pain medication that includes Acetaminophen (Tylenol), do not take additional Acetaminophen (Tylenol) while taking the prescribed medication.        Depression / Suicide Risk    As you are discharged from this [facility] facility, it is important to learn how to keep safe from harming yourself.    Recognize the warning signs:  · Abrupt changes in personality, positive or negative- including increase in energy   · Giving away possessions  · Change in eating patterns- significant weight changes-  positive or negative  · Change in sleeping patterns- unable to sleep or sleeping all the time   · Unwillingness or inability to communicate  · Depression  · Unusual sadness, discouragement and loneliness  · Talk of wanting to die  · Neglect of personal appearance   · Rebelliousness- reckless behavior  · Withdrawal from people/activities they love  · Confusion- inability to concentrate     If you or a loved one observes any of these behaviors or has concerns about self-harm, here's what you can do:  · Talk about it- your feelings and reasons for  harming yourself  · Remove any means that you might use to hurt yourself (examples: pills, rope, extension cords, firearm)  · Get professional help from the community (Mental Health, Substance Abuse, psychological counseling)  · Do not be alone:Call your Safe Contact- someone whom you trust who will be there for you.  · Call your local CRISIS HOTLINE 792-6025 or 422-484-2073  · Call your local Children's Mobile Crisis Response Team [place] (579) 991-6301 or [employer]X  · Call the toll free National Suicide Prevention Hotlines   · National Suicide Prevention Lifeline 652-600-ENKW (3574)  [employer] 800-SUICIDE (668-4597)    Peripheral Nerve Block Discharge Instructions from Same Day Surgery and Inpatient :    What to Expect - Upper Extremity  · You may experience numbness and weakness in {ARM LOCATION [id number]:338697}  on the same side as your surgery  · This is normal. For some people, this may be an unpleasant sensation. Be very careful with your numb limb  · Ask for help when you need it  Shoulder Surgery Side Effects  · In addition to numbness and weakness you may experience other symptoms  · Other nerves that are close to those nerves injected can also be affected by local anesthesia  · You may experience a hoarseness in your voice  · Your breathing may feel different  · You may also notice drooping of your eyelid, pupil constriction, and decreased sweating, on the side of your surgery  · All of these side effects are normal and will resolve when the local anesthetic wears off   Prevent Injury  · Protect the limb like a baby  · Beware of exposing your limb to extreme heat or cold or trauma  · The limb may be injured without you noticing because it is numb  · Keep the limb elevated whenever possible  · Do not sleep on the limb  · Change the position of the limb regularly  · Avoid putting pressure on your surgical limb  Pain Control  · The initial block on the day of surgery will make  "your extremity feel \"numb\"  · Any consecutive injection including prior to discharge from the hospital will make your extremity feel \"numb\"  · You may feel an aching or burning when the local anesthesia starts to wear off  · Take pain pills as prescribed by your surgeon  · Call your surgeon or anesthesiologist if you do not have adequate pain control  ·   "

## 2021-03-22 NOTE — OR SURGEON
Immediate Post OP Note    PreOp Diagnosis: L partial RCT    PostOp Diagnosis: same    Procedure(s):  ARTHROSCOPY, SHOULDER, WITH DEBRIDEMENT - Wound Class: Clean  DECOMPRESSION, SHOULDER, ARTHROSCOPIC - SUBACROMIAL - Wound Class: Clean    Surgeon(s):  Panda Calderón M.D.    Anesthesiologist/Type of Anesthesia:  Anesthesiologist: Ishmael Domingo M.D./General    Surgical Staff:  Circulator: Mayra Colon R.N.  Scrub Person: Tyler Lopes  First Assist: Ashley Colunga R.N.    Specimens removed if any:  * No specimens in log *    Estimated Blood Loss: min    Findings: above    Complications: none        3/22/2021 10:27 AM Panda Calderón M.D.

## (undated) DEVICE — PROTECTOR ULNA NERVE - (36PR/CA)

## (undated) DEVICE — TUBE CONNECTING SUCTION - CLEAR PLASTIC STERILE 72 IN (50EA/CA)

## (undated) DEVICE — GLOVE BIOGEL SZ 8 SURGICAL PF LTX - (50PR/BX 4BX/CA)

## (undated) DEVICE — GOWN WARMING STANDARD FLEX - (30/CA)

## (undated) DEVICE — SUTURE GENERAL

## (undated) DEVICE — TUBING PATIENT W/CONNECTOR REDEUCE (1EA)

## (undated) DEVICE — CANNULA THREADED 5X75 (5EA/BX)

## (undated) DEVICE — ELECTRODE DUAL RETURN W/ CORD - (50/PK)

## (undated) DEVICE — DRAPE LARGE 3 QUARTER - (20/CA)

## (undated) DEVICE — KIT ANESTHESIA W/CIRCUIT & 3/LT BAG W/FILTER (20EA/CA)

## (undated) DEVICE — DRAPE U ORTHOPEDIC - (10/BX)

## (undated) DEVICE — SLEEVE SHOULDER DISP(ARTHREX) - (6/BX)

## (undated) DEVICE — WATER IRRIGATION STERILE 1000ML (12EA/CA)

## (undated) DEVICE — ELECTRODE 850 FOAM ADHESIVE - HYDROGEL RADIOTRNSPRNT (50/PK)

## (undated) DEVICE — SODIUM CHL IRRIGATION 0.9% 1000ML (12EA/CA)

## (undated) DEVICE — GLOVE, LITE (PAIR)

## (undated) DEVICE — NEEDLE MULTIFIRE (5EA/BX)

## (undated) DEVICE — LACTATED RINGERS INJ 1000 ML - (14EA/CA 60CA/PF)

## (undated) DEVICE — NEPTUNE 4 PORT MANIFOLD - (20/PK)

## (undated) DEVICE — TUBING PUMP WITH CONNECTOR REDEUCE (1EA)

## (undated) DEVICE — GLOVE SURGICAL PROTEXIS PI 8.0 LF - (50PR/BX)

## (undated) DEVICE — CANISTER SUCTION RIGID RED 1500CC (40EA/CA)

## (undated) DEVICE — HEAD HOLDER JUNIOR/ADULT

## (undated) DEVICE — SHAVER4.0 AGGRESSIVE + FORMLA (5EA/BX)

## (undated) DEVICE — DRAPE SHOULDER FLUID CONTROL - 77 X 85 (10/CA)

## (undated) DEVICE — SHAVER4.0 RESECTOR FORMULA - (5EA/BX)

## (undated) DEVICE — SUCTION INSTRUMENT YANKAUER BULBOUS TIP W/O VENT (50EA/CA)

## (undated) DEVICE — SUTURE 1 PDS-2 PLUS CTX - (24/BX)

## (undated) DEVICE — SUTURE 3-0 ETHILON FS-1 - (36/BX) 30 INCH

## (undated) DEVICE — PACK SHOULDER ARTHROSCOPY SM - (2EA/CA)

## (undated) DEVICE — CHLORAPREP 26 ML APPLICATOR - ORANGE TINT(25/CA)

## (undated) DEVICE — MASK ANESTHESIA ADULT  - (100/CA)

## (undated) DEVICE — PROBE VULCAN 90 DEG W/SUCTION

## (undated) DEVICE — CANNULA FULLY THREADED 8 X 75 (5EA/BX)

## (undated) DEVICE — BAG SPONGE COUNT 10.25 X 32 - BLUE (250/CA)

## (undated) DEVICE — SENSOR SPO2 NEO LNCS ADHESIVE (20/BX) SEE USER NOTES

## (undated) DEVICE — GLOVE BIOGEL PI ORTHO SZ 6 1/2 SURGICAL PF LF (40PR/BX)

## (undated) DEVICE — HUMID-VENT HEAT AND MOISTURE EXCHANGE- (50/BX)

## (undated) DEVICE — GLOVE BIOGEL SZ 6.5 SURGICAL PF LTX (50PR/BX 4BX/CA)